# Patient Record
Sex: FEMALE | Race: WHITE | Employment: FULL TIME | URBAN - METROPOLITAN AREA
[De-identification: names, ages, dates, MRNs, and addresses within clinical notes are randomized per-mention and may not be internally consistent; named-entity substitution may affect disease eponyms.]

---

## 2023-12-31 ENCOUNTER — HOSPITAL ENCOUNTER (OUTPATIENT)
Age: 33
Discharge: HOME | End: 2023-12-31
Payer: COMMERCIAL

## 2023-12-31 VITALS
TEMPERATURE: 97 F | HEART RATE: 73 BPM | WEIGHT: 220 LBS | RESPIRATION RATE: 18 BRPM | DIASTOLIC BLOOD PRESSURE: 75 MMHG | HEIGHT: 67 IN | SYSTOLIC BLOOD PRESSURE: 132 MMHG | OXYGEN SATURATION: 100 % | BODY MASS INDEX: 34.53 KG/M2

## 2023-12-31 DIAGNOSIS — N30.00 ACUTE CYSTITIS WITHOUT HEMATURIA: Primary | ICD-10-CM

## 2023-12-31 LAB
B-HCG UR QL: NEGATIVE
BILIRUB UR QL STRIP: NEGATIVE
COLOR UR: YELLOW
GLUCOSE UR STRIP-MCNC: NEGATIVE MG/DL
KETONES UR STRIP-MCNC: NEGATIVE MG/DL
NITRITE UR QL STRIP: NEGATIVE
PH UR STRIP: 6 [PH]
PROT UR STRIP-MCNC: 30 MG/DL
SP GR UR STRIP: >=1.03
UROBILINOGEN UR STRIP-ACNC: <2 MG/DL

## 2023-12-31 PROCEDURE — 87086 URINE CULTURE/COLONY COUNT: CPT | Performed by: PHYSICIAN ASSISTANT

## 2023-12-31 RX ORDER — CEPHALEXIN 500 MG/1
500 CAPSULE ORAL 2 TIMES DAILY
Qty: 14 CAPSULE | Refills: 0 | Status: SHIPPED | OUTPATIENT
Start: 2023-12-31 | End: 2024-01-07

## 2023-12-31 NOTE — DISCHARGE INSTRUCTIONS
Take the antibiotic as directed. Follow up with your primary doctor. If you have new, changing or worsening symptoms, please go directly to the ER.

## 2023-12-31 NOTE — ED INITIAL ASSESSMENT (HPI)
Patient is having trouble fully emptying her bladder and has frequency. She has discomfort when going.

## 2024-07-25 ENCOUNTER — OFFICE VISIT (OUTPATIENT)
Dept: FAMILY MEDICINE CLINIC | Facility: CLINIC | Age: 34
End: 2024-07-25
Payer: COMMERCIAL

## 2024-07-25 VITALS
SYSTOLIC BLOOD PRESSURE: 130 MMHG | WEIGHT: 242 LBS | DIASTOLIC BLOOD PRESSURE: 78 MMHG | HEIGHT: 67 IN | OXYGEN SATURATION: 97 % | BODY MASS INDEX: 37.98 KG/M2 | HEART RATE: 96 BPM | RESPIRATION RATE: 16 BRPM

## 2024-07-25 DIAGNOSIS — F32.A DEPRESSION, UNSPECIFIED DEPRESSION TYPE: ICD-10-CM

## 2024-07-25 DIAGNOSIS — Z00.00 ROUTINE GENERAL MEDICAL EXAMINATION AT A HEALTH CARE FACILITY: Primary | ICD-10-CM

## 2024-07-25 DIAGNOSIS — F43.9 STRESS: ICD-10-CM

## 2024-07-25 DIAGNOSIS — Z12.4 PAP SMEAR FOR CERVICAL CANCER SCREENING: ICD-10-CM

## 2024-07-25 PROCEDURE — 3075F SYST BP GE 130 - 139MM HG: CPT | Performed by: INTERNAL MEDICINE

## 2024-07-25 PROCEDURE — 3078F DIAST BP <80 MM HG: CPT | Performed by: INTERNAL MEDICINE

## 2024-07-25 PROCEDURE — 3008F BODY MASS INDEX DOCD: CPT | Performed by: INTERNAL MEDICINE

## 2024-07-25 PROCEDURE — 99385 PREV VISIT NEW AGE 18-39: CPT | Performed by: INTERNAL MEDICINE

## 2024-07-25 RX ORDER — BUPROPION HYDROCHLORIDE 150 MG/1
1 TABLET ORAL DAILY
COMMUNITY
Start: 2022-07-11 | End: 2024-07-28

## 2024-07-25 RX ORDER — MELOXICAM 15 MG/1
15 TABLET ORAL
COMMUNITY
Start: 2024-04-09 | End: 2024-07-28

## 2024-07-25 NOTE — PROGRESS NOTES
HPI:   Edith Diaz is a 34 year old female, new to our practice, who presents for a well woman exam. Symptoms:  breastfeeding until April, recently had an  2 weeks ago , bleeding still.    No LMP recorded. (Menstrual status: Irregular Periods).  Previous pap: yes, last one 2-3 years ago  Performs SBEs:no  Contraception: nothing                          Thought she had carpal tunnel. Hands feel tired and they hurt. Specialist didn't think she had it, gave Mobic. Sent to a neuro provider. Not taking Mobic at this time.    Not on bupropion. Stopped when she was pregnant.    Pt under a lot of stress. Working full time in a high stress job. 3 children at home. Has an hour commute to work and back each day.    Current Outpatient Medications   Medication Sig Dispense Refill    Meloxicam 15 MG Oral Tab Take 1 tablet (15 mg total) by mouth daily with food. (Patient not taking: Reported on 2024)      Levonorgestrel 20 MCG/DAY Intrauterine IUD 20 mcg (1 each total) by Intrauterine route. (Patient not taking: Reported on 2024)      buPROPion  MG Oral Tablet 24 Hr Take 1 tablet (150 mg total) by mouth daily. (Patient not taking: Reported on 2024)        No past medical history on file.   No past surgical history on file.   No family history on file.   Social History:   Social History     Socioeconomic History    Marital status: Single   Work full time    Exercise: none.  Diet: doesn't watch     REVIEW OF SYSTEMS:   GENERAL: feels well otherwise  SKIN: denies unusual skin lesions  HEENT:no vision or hearing changes; denies nasal congestion, sinus pain or sore throat  LUNGS: denies shortness of breath, chest heaviness or cough  CV: denies chest pain, pressure or palpitations  GI: denies abdominal pain; denies heartburn, frequent diarrhea or constipation  : denies dysuria, vaginal discharge or itching; denies pelvic pain  MS: denies back pain  NEURO: denies headaches; no dizziness  PSYCH: feeling  sad, overwhelmed, stressed. Not sleeping well, not sleeping enough. Denies SI, HI, or thought of harm to others.  No report of panic.   HEME: denies hx of anemia  ENDOCRINE: denies thyroid history, denies excessive thirst, denies significant weight change  ALL/ASTHMA: denies hx of  allergy or asthma    EXAM:   /78   Pulse 96   Resp 16   Ht 5' 7\" (1.702 m)   Wt 242 lb (109.8 kg)   SpO2 97%   BMI 37.90 kg/m²       Body mass index is 37.9 kg/m².      GENERAL: pleasant and in no acute distress  SKIN: warm and dry  HEENT: atraumatic, normocephalic; PERRLA, conjunctiva clear; ears, nose and throat are clear  NECK: supple,no adenopathy, no thyromegaly  LUNGS: clear to auscultation, easy breathing  CV: normal S1S2, RRR without murmur  MS: Sussy, no bony deformities  EXT: no cyanosis, clubbing or edema  NEURO: A&Ox3, motor and sensory are grossly intact, good coordination; no tremors  PSYCH: pleasant, tearful slightly, somewhat shy or guarded, to discuss her feelings but answers my questions      ASSESSMENT AND PLAN:    1. Routine general medical examination at a health care facility  Reinforced routine SBEs. Discussed the benefits of routine exercise, a heart healthy diet and annual flu vaccines.  - CBC With Differential With Platelet; Future  - Comp Metabolic Panel (14); Future  - Lipid Panel; Future  - TSH W Reflex To Free T4; Future  - Hemoglobin A1C; Future  - [facility] referral for pap smear    2. Stress  -- discussed counseling with or without medicine, pt doesn't answer to either option at this time  - [facility] Referral - In Network    3. Depression, unspecified depression type  - discussed counseling with or without medicine, pt doesn't answer to either option at this time  - [facility] Referral - In Network          Edith was given an opportunity to ask questions and verbalized understanding of care. Follow up 1 month, sooner if needed.             .

## 2024-07-26 ENCOUNTER — LAB ENCOUNTER (OUTPATIENT)
Dept: LAB | Age: 34
End: 2024-07-26
Attending: INTERNAL MEDICINE
Payer: COMMERCIAL

## 2024-07-26 DIAGNOSIS — Z00.00 ROUTINE GENERAL MEDICAL EXAMINATION AT A HEALTH CARE FACILITY: ICD-10-CM

## 2024-07-26 LAB
ALBUMIN SERPL-MCNC: 4.5 G/DL (ref 3.2–4.8)
ALBUMIN/GLOB SERPL: 1.5 {RATIO} (ref 1–2)
ALP LIVER SERPL-CCNC: 81 U/L
ALT SERPL-CCNC: 54 U/L
ANION GAP SERPL CALC-SCNC: 8 MMOL/L (ref 0–18)
AST SERPL-CCNC: 34 U/L (ref ?–34)
BASOPHILS # BLD AUTO: 0.03 X10(3) UL (ref 0–0.2)
BASOPHILS NFR BLD AUTO: 0.5 %
BILIRUB SERPL-MCNC: 0.4 MG/DL (ref 0.3–1.2)
BUN BLD-MCNC: 6 MG/DL (ref 9–23)
CALCIUM BLD-MCNC: 9.5 MG/DL (ref 8.7–10.4)
CHLORIDE SERPL-SCNC: 108 MMOL/L (ref 98–112)
CHOLEST SERPL-MCNC: 203 MG/DL (ref ?–200)
CO2 SERPL-SCNC: 21 MMOL/L (ref 21–32)
CREAT BLD-MCNC: 0.73 MG/DL
EGFRCR SERPLBLD CKD-EPI 2021: 112 ML/MIN/1.73M2 (ref 60–?)
EOSINOPHIL # BLD AUTO: 0.13 X10(3) UL (ref 0–0.7)
EOSINOPHIL NFR BLD AUTO: 2.3 %
ERYTHROCYTE [DISTWIDTH] IN BLOOD BY AUTOMATED COUNT: 12.6 %
EST. AVERAGE GLUCOSE BLD GHB EST-MCNC: 111 MG/DL (ref 68–126)
FASTING PATIENT LIPID ANSWER: YES
FASTING STATUS PATIENT QL REPORTED: YES
GLOBULIN PLAS-MCNC: 3.1 G/DL (ref 2–3.5)
GLUCOSE BLD-MCNC: 95 MG/DL (ref 70–99)
HBA1C MFR BLD: 5.5 % (ref ?–5.7)
HCT VFR BLD AUTO: 42.6 %
HDLC SERPL-MCNC: 41 MG/DL (ref 40–59)
HGB BLD-MCNC: 14.6 G/DL
IMM GRANULOCYTES # BLD AUTO: 0.02 X10(3) UL (ref 0–1)
IMM GRANULOCYTES NFR BLD: 0.3 %
LDLC SERPL CALC-MCNC: 149 MG/DL (ref ?–100)
LYMPHOCYTES # BLD AUTO: 1.96 X10(3) UL (ref 1–4)
LYMPHOCYTES NFR BLD AUTO: 34 %
MCH RBC QN AUTO: 30.3 PG (ref 26–34)
MCHC RBC AUTO-ENTMCNC: 34.3 G/DL (ref 31–37)
MCV RBC AUTO: 88.4 FL
MONOCYTES # BLD AUTO: 0.39 X10(3) UL (ref 0.1–1)
MONOCYTES NFR BLD AUTO: 6.8 %
NEUTROPHILS # BLD AUTO: 3.23 X10 (3) UL (ref 1.5–7.7)
NEUTROPHILS # BLD AUTO: 3.23 X10(3) UL (ref 1.5–7.7)
NEUTROPHILS NFR BLD AUTO: 56.1 %
NONHDLC SERPL-MCNC: 162 MG/DL (ref ?–130)
OSMOLALITY SERPL CALC.SUM OF ELEC: 281 MOSM/KG (ref 275–295)
PLATELET # BLD AUTO: 295 10(3)UL (ref 150–450)
POTASSIUM SERPL-SCNC: 3.9 MMOL/L (ref 3.5–5.1)
PROT SERPL-MCNC: 7.6 G/DL (ref 5.7–8.2)
RBC # BLD AUTO: 4.82 X10(6)UL
SODIUM SERPL-SCNC: 137 MMOL/L (ref 136–145)
TRIGL SERPL-MCNC: 70 MG/DL (ref 30–149)
TSI SER-ACNC: 1.43 MIU/ML (ref 0.55–4.78)
VLDLC SERPL CALC-MCNC: 13 MG/DL (ref 0–30)
WBC # BLD AUTO: 5.8 X10(3) UL (ref 4–11)

## 2024-07-26 PROCEDURE — 80050 GENERAL HEALTH PANEL: CPT | Performed by: INTERNAL MEDICINE

## 2024-07-26 PROCEDURE — 83036 HEMOGLOBIN GLYCOSYLATED A1C: CPT | Performed by: INTERNAL MEDICINE

## 2024-07-26 PROCEDURE — 80061 LIPID PANEL: CPT | Performed by: INTERNAL MEDICINE

## 2024-07-28 PROBLEM — G56.00 CARPAL TUNNEL SYNDROME: Status: ACTIVE | Noted: 2024-04-09

## 2024-07-30 DIAGNOSIS — E78.00 HIGH CHOLESTEROL: Primary | ICD-10-CM

## 2024-09-03 ENCOUNTER — TELEMEDICINE (OUTPATIENT)
Dept: FAMILY MEDICINE CLINIC | Facility: CLINIC | Age: 34
End: 2024-09-03
Payer: COMMERCIAL

## 2024-09-03 ENCOUNTER — PATIENT MESSAGE (OUTPATIENT)
Dept: FAMILY MEDICINE CLINIC | Facility: CLINIC | Age: 34
End: 2024-09-03

## 2024-09-03 DIAGNOSIS — Z71.3 ENCOUNTER FOR WEIGHT LOSS COUNSELING: ICD-10-CM

## 2024-09-03 DIAGNOSIS — E66.01 CLASS 2 SEVERE OBESITY DUE TO EXCESS CALORIES WITH SERIOUS COMORBIDITY AND BODY MASS INDEX (BMI) OF 37.0 TO 37.9 IN ADULT (HCC): Primary | ICD-10-CM

## 2024-09-03 DIAGNOSIS — E78.00 PURE HYPERCHOLESTEROLEMIA: ICD-10-CM

## 2024-09-03 DIAGNOSIS — E78.00 HIGH CHOLESTEROL: ICD-10-CM

## 2024-09-03 PROCEDURE — 99213 OFFICE O/P EST LOW 20 MIN: CPT | Performed by: INTERNAL MEDICINE

## 2024-09-03 NOTE — PROGRESS NOTES
Video Visit  Edith Diaz is a 34 year old female.  Chief Complaint   Patient presents with    Weight Loss         HPI:   Pt requests a video visit to discuss trying medication to help with weight loss.  Cholesterol elevated.  Blood sugar was unremarkable.  Pt would like to exercise more but travels over an hour to and from work, works long hours, full time.  Has 3 children at home.  Very busy, + stressful.    Current Outpatient Medications   Medication Sig Dispense Refill    ondansetron 8 MG Oral Tablet Dispersible Take 1 tablet (8 mg total) by mouth every 8 (eight) hours as needed for Nausea. 15 tablet 0    Tirzepatide-Weight Management (ZEPBOUND) 2.5 MG/0.5ML Subcutaneous Solution Auto-injector Inject 2.5 mg into the skin once a week for 4 doses. 2 mL 0      No past medical history on file.   No past surgical history on file.   Social History:  Social History     Socioeconomic History    Marital status: Single        REVIEW OF SYSTEMS:   GENERAL HEALTH: Denies fevers, chills or myalgias  HEENT: Denies ear pain, nasal congestion, sinus pain, or sore throat  SKIN: Denies rash  LUNGS: Denies shortness of breath, wheezing, or cough  CV: Denies chest pain or palpitations; denies lightheadedness  GI: Denies abdominal pain, denies N/V/D  MS: Denies back, neck or joint pain  NEURO: Denies headaches  ALLERGY/ASTHMA: Denies asthma and environmental allergies       EXAM:   GENERAL: well developed, well nourished, NAD.  SKIN: No rash visible  HEENT: AT/NC. Normal lips, gums and teeth; no hyponasal tone on video visit  LUNGS: Speaking in complete sentences comfortably without increased work of breathing; no cough during visit  PSYCH: Normal mood and affect, A&Ox3, affect is consistent with mood      ASSESSMENT AND PLAN:   1. BMI 37.0-37.9, adult  Discussed SEs, risks, benefits of Semagutides.   Pt instructed to call her insurance to see which injectable is preferred.    2. High cholesterol  Dietary modifications  discussed  Weight loss desirable   Recheck in 3 months    3. Encounter for weight loss counseling  Heart healthy meal planning and increase in exercise is recommended.  Discussed that weight loss medications are not approved for life long use and will need to be weaned off of eventually.  [facility] referral.          The patient indicates understanding of these issues and agrees to the plan.    Follow up 1 month    Duration of visit: 12 min      Edith Diaz understands a video evaluation is not a substitute for face-to-face examination or emergency care. Patient advised to go to ER or call 911 for worsening symptoms or acute distress.   Please note that the following visit was completed using two-way, real-time interactive video communication. This has been done in good calli to provide continuity of care in the best interest of the provider-patient relationship, due to the ongoing public health crisis/national emergency and because of restrictions of visitation. There are limitations of this visit as limited physical exam could be performed. Every conscious effort was taken to allow for sufficient and adequate time. This billing was spent on reviewing labs, medications, radiology tests and decision making. Appropriate medical decision-making and tests are ordered as detailed in the plan of care above.

## 2024-09-04 RX ORDER — TIRZEPATIDE 2.5 MG/.5ML
2.5 INJECTION, SOLUTION SUBCUTANEOUS WEEKLY
Qty: 2 ML | Refills: 0 | Status: SHIPPED | OUTPATIENT
Start: 2024-09-04 | End: 2024-09-26

## 2024-09-04 NOTE — TELEPHONE ENCOUNTER
From: Edith Diaz  To: Faye Ramos  Sent: 9/3/2024 6:20 PM CDT  Subject: Insurance Approved Meds    Blayne Mckinney, I confirmed with my insurance that they cover Zepbound, Wagovy, and Saxenda.

## 2024-09-05 RX ORDER — ONDANSETRON 4 MG/1
4 TABLET, ORALLY DISINTEGRATING ORAL EVERY 8 HOURS PRN
Refills: 0 | Status: CANCELLED
Start: 2024-09-05

## 2024-09-05 RX ORDER — ONDANSETRON 8 MG/1
8 TABLET, ORALLY DISINTEGRATING ORAL EVERY 8 HOURS PRN
Qty: 15 TABLET | Refills: 0 | Status: SHIPPED | OUTPATIENT
Start: 2024-09-05

## 2024-09-28 DIAGNOSIS — E66.812 CLASS 2 SEVERE OBESITY DUE TO EXCESS CALORIES WITH SERIOUS COMORBIDITY AND BODY MASS INDEX (BMI) OF 37.0 TO 37.9 IN ADULT (HCC): ICD-10-CM

## 2024-09-28 DIAGNOSIS — E78.00 PURE HYPERCHOLESTEROLEMIA: ICD-10-CM

## 2024-09-28 DIAGNOSIS — E66.01 CLASS 2 SEVERE OBESITY DUE TO EXCESS CALORIES WITH SERIOUS COMORBIDITY AND BODY MASS INDEX (BMI) OF 37.0 TO 37.9 IN ADULT (HCC): ICD-10-CM

## 2024-09-30 ENCOUNTER — PATIENT MESSAGE (OUTPATIENT)
Dept: FAMILY MEDICINE CLINIC | Facility: CLINIC | Age: 34
End: 2024-09-30

## 2024-09-30 RX ORDER — TIRZEPATIDE 2.5 MG/.5ML
2.5 INJECTION, SOLUTION SUBCUTANEOUS WEEKLY
Refills: 0 | OUTPATIENT
Start: 2024-09-30 | End: 2024-10-22

## 2024-09-30 RX ORDER — TIRZEPATIDE 2.5 MG/.5ML
2.5 INJECTION, SOLUTION SUBCUTANEOUS WEEKLY
COMMUNITY
Start: 2024-09-05 | End: 2024-12-02 | Stop reason: DRUGHIGH

## 2024-09-30 NOTE — TELEPHONE ENCOUNTER
A refill request was received for:  Requested Prescriptions     Pending Prescriptions Disp Refills    ZEPBOUND 2.5 MG/0.5ML Subcutaneous Solution Auto-injector [Pharmacy Med Name: ZEPBOUND 2.5 MG/0.5 ML PEN]  0     Sig: INJECT 2.5 MG INTO THE SKIN ONCE A WEEK FOR 4 DOSES.       Last refill date:09/05/24       Last office visit:07/25/24    Future Appointments   Date Time Provider Department Center   10/1/2024  4:00 PM Sonya Aquino APRN [facility]

## 2024-10-01 ENCOUNTER — OFFICE VISIT (OUTPATIENT)
Facility: CLINIC | Age: 34
End: 2024-10-01
Payer: COMMERCIAL

## 2024-10-01 VITALS
SYSTOLIC BLOOD PRESSURE: 118 MMHG | HEIGHT: 67 IN | DIASTOLIC BLOOD PRESSURE: 78 MMHG | BODY MASS INDEX: 37.67 KG/M2 | WEIGHT: 240 LBS

## 2024-10-01 DIAGNOSIS — Z30.011 ENCOUNTER FOR BCP (BIRTH CONTROL PILLS) INITIAL PRESCRIPTION: ICD-10-CM

## 2024-10-01 DIAGNOSIS — Z32.02 PREGNANCY EXAMINATION OR TEST, NEGATIVE RESULT: ICD-10-CM

## 2024-10-01 DIAGNOSIS — Z98.890 HISTORY OF ELECTIVE ABORTION: ICD-10-CM

## 2024-10-01 DIAGNOSIS — Z32.00 ENCOUNTER FOR PREGNANCY TEST, RESULT UNKNOWN: ICD-10-CM

## 2024-10-01 DIAGNOSIS — N93.9 ABNORMAL UTERINE BLEEDING (AUB): Primary | ICD-10-CM

## 2024-10-01 LAB
KIT LOT #: NORMAL NUMERIC
PREGNANCY TEST, URINE: NEGATIVE

## 2024-10-01 PROCEDURE — 3078F DIAST BP <80 MM HG: CPT

## 2024-10-01 PROCEDURE — 3008F BODY MASS INDEX DOCD: CPT

## 2024-10-01 PROCEDURE — 3074F SYST BP LT 130 MM HG: CPT

## 2024-10-01 PROCEDURE — 81025 URINE PREGNANCY TEST: CPT

## 2024-10-01 PROCEDURE — 99213 OFFICE O/P EST LOW 20 MIN: CPT

## 2024-10-01 RX ORDER — NORETHINDRONE ACETATE AND ETHINYL ESTRADIOL 1MG-20(21)
1 KIT ORAL DAILY
Qty: 28 TABLET | Refills: 2 | Status: SHIPPED | OUTPATIENT
Start: 2024-10-01 | End: 2025-10-01

## 2024-10-01 NOTE — PROGRESS NOTES
Gynecology Office Visit      Edith Diaz is a 34 year old female  Patient's last menstrual period was 2024 (approximate). (contraception:  none)     HPI:     Chief Complaint   Patient presents with    Menstrual Problem     Patient reports she had bleeding since August until mid september. Had  in July.     Edith presents for vaginal bleeding lasting approximately 6 weeks from  to mid-September. She stopped breastfeeding in April and got a period in May which was normal. She then missed a period in  and found out she was pregnant. She then had a surgical D+C at a family planning clinic in [place] at about 8-9 weeks gestation - an ultrasound was not done afterwards. She had bleeding after the procedure which stopped but then started again during the first week of August and lasted until September. When she was persistently bleeding, the flow was getting lighter and she did not have clots - just persistent spotting. She has since stopped bleeding. Not currently on any form of contraception. Was given a paper prescription for OCPs after her EAB but she misplaced the paper and never got the prescription. Has used Depo and mirena IUD in the past for contraception. Interested in starting OCPs for contraception at this time.    Chart and previous encounters reviewed.  HISTORY:  History reviewed. No pertinent past medical history.   Past Surgical History:   Procedure Laterality Date    Elbow surgery Left       Family History   Problem Relation Age of Onset    Other (assassinated) Father         age 65    Other (T2 diabetic) Mother         insulin    Depression Mother     Breast Cancer Mother 40        had genetic cancer screening: Unsure of results.    Other (GERD) Brother     Breast Cancer Maternal Aunt 42      Social History:   Social History     Socioeconomic History    Marital status: Single   Tobacco Use    Smoking status: Never     Passive exposure: Never    Smokeless tobacco:  Never   Substance and Sexual Activity    Alcohol use: Yes    Drug use: Not Currently     Types: Cannabis        Medications (Active prior to today's visit):  Current Outpatient Medications   Medication Sig Dispense Refill    Norethin Ace-Eth Estrad-FE (JUNEL FE 1/20) 1-20 MG-MCG Oral Tab Take 1 tablet by mouth daily. 28 tablet 2    ZEPBOUND 2.5 MG/0.5ML Subcutaneous Solution Auto-injector Inject 2.5 mg into the skin once a week.      ondansetron 8 MG Oral Tablet Dispersible Take 1 tablet (8 mg total) by mouth every 8 (eight) hours as needed for Nausea. (Patient not taking: Reported on 10/1/2024) 15 tablet 0       Allergies:  No Known Allergies    Gyn:  Menarche: 13-14  Period Cycle (Days): Irregular  Period Duration (Days): 3-4  Period Flow: moderate, with cramping  Use of Birth Control (if yes, specify type): None  Date When Birth Control Last Used: Mirena removed   Pap Date: 03/18/15  Pap Result Notes: ASCUS.  (  abnormal per pt in [place])  Follow Up Recommendation: due    OB Hx:  OB History    Para Term  AB Living   4 3 3   1 3   SAB IAB Ectopic Multiple Live Births     1     3      # Outcome Date GA Lbr Rob/2nd Weight Sex Type Anes PTL Lv   4 IAB 2024     THERAPEUTIC      3 Term 22    F NORMAL SPONT   ANGELIQUE   2 Term 02/09/15    F NORMAL SPONT   ANGELIQUE   1 Term 15    F NORMAL SPONT   ANGELIQUE         ROS:     10 point ROS completed and was negative, except for pertinent positive and negatives stated in the HPI.    PHYSICAL EXAM:   /78   Ht 67\"   Wt 240 lb (108.9 kg)   LMP 2024 (Approximate)   BMI 37.59 kg/m²      Wt Readings from Last 6 Encounters:   10/01/24 240 lb (108.9 kg)   24 242 lb (109.8 kg)   23 220 lb (99.8 kg)        Gen:  Oriented, in no acute distress  Abdomen: no scars, scaphoid, benign without peritoneal signs, rebound tenderness,   guarding, or masses    Pelvic:      External Genitalia: Normal appearing, no lesions.  Vagina: normal pink  mucosa, no lesions, normal clear discharge.    no anterior or posterior hernias.  Cervix: multiparous, no lesions , No CMT   Uterus: mobile, non tender, normal size  Adnexa: non tender, no masses, normal size  Rectal: deferred       ASSESSMENT/PLAN:     1. Abnormal uterine bleeding (AUB)  - Urine Preg Test [78293]    2. History of elective     3. Encounter for pregnancy test, result unknown    4. Encounter for BCP (birth control pills) initial prescription  - Norethin Ace-Eth Estrad-FE (JUNEL FE 1/20) 1-20 MG-MCG Oral Tab; Take 1 tablet by mouth daily.  Dispense: 28 tablet; Refill: 2    5. Pregnancy examination or test, negative result    Plan ultrasound to evaluate AUB  Urine pregnancy test negative, appropriate for OCP initiation. Reviewed ACHES and danger signs, rx sent to pharmacy    Meds This Visit:  Requested Prescriptions     Signed Prescriptions Disp Refills    Norethin Ace-Eth Estrad-FE (JUNEL FE 1/20) 1-20 MG-MCG Oral Tab 28 tablet 2     Sig: Take 1 tablet by mouth daily.       Imaging & Referrals:  None     Return in about 3 weeks (around 10/22/2024) for ultrasound, 3 months for OCP follow up.    Sonyajessica Aquino, AVERY  10/1/2024  4:06 PM    This note was created by Layar voice recognition. Errors in content may be related to improper recognition by the system; efforts to review and correct have been done but errors may still exist. Please contact me with any questions.    Note to patient and family   The 21st Century Cures Act makes medical notes available to patients in the interest of transparency.  However, please be advised that this is a medical document.  It is intended as oism-tz-exvu communication.  It is written and medical language may contain abbreviations or verbiage that are technical and unfamiliar.  It may appear blunt or direct.  Medical documents are intended to carry relevant information, facts as evident, and the clinical opinion of the practitioner.

## 2024-10-12 ENCOUNTER — TELEMEDICINE (OUTPATIENT)
Dept: FAMILY MEDICINE CLINIC | Facility: CLINIC | Age: 34
End: 2024-10-12
Payer: COMMERCIAL

## 2024-10-12 DIAGNOSIS — Z51.81 ENCOUNTER FOR THERAPEUTIC DRUG MONITORING: Primary | ICD-10-CM

## 2024-10-12 DIAGNOSIS — E66.812 CLASS 2 OBESITY DUE TO EXCESS CALORIES WITHOUT SERIOUS COMORBIDITY WITH BODY MASS INDEX (BMI) OF 37.0 TO 37.9 IN ADULT: ICD-10-CM

## 2024-10-12 DIAGNOSIS — E66.09 CLASS 2 OBESITY DUE TO EXCESS CALORIES WITHOUT SERIOUS COMORBIDITY WITH BODY MASS INDEX (BMI) OF 37.0 TO 37.9 IN ADULT: ICD-10-CM

## 2024-10-12 PROCEDURE — 99213 OFFICE O/P EST LOW 20 MIN: CPT | Performed by: INTERNAL MEDICINE

## 2024-10-12 RX ORDER — TIRZEPATIDE 5 MG/.5ML
5 INJECTION, SOLUTION SUBCUTANEOUS WEEKLY
Qty: 2 ML | Refills: 0 | Status: SHIPPED | OUTPATIENT
Start: 2024-10-12 | End: 2024-11-03

## 2024-10-12 NOTE — PROGRESS NOTES
Video Visit  Edith iDaz is a 34 year old female.  No chief complaint on file.        HPI:   Pt requests a video visit to discuss weight loss follow up.  Started on Zepbound, baseline weight 242#    Zepbound SEs- diarrhea or emesis, felt like she had a sour stomach. Occurred 3 out of the 4 weeks. Called off the first week she was on it. Then it got a little better, where it only lasted one day. The last dose week, she felt sick one time that day.   Fluctuates between 234-237. #235  5'7\"  Would like to continue to see if she 'gets used to' the med and subsequent SEs.    Current Outpatient Medications   Medication Sig Dispense Refill    Norethin Ace-Eth Estrad-FE (JUNEL FE 1/20) 1-20 MG-MCG Oral Tab Take 1 tablet by mouth daily. 28 tablet 2    ZEPBOUND 2.5 MG/0.5ML Subcutaneous Solution Auto-injector Inject 2.5 mg into the skin once a week.      ondansetron 8 MG Oral Tablet Dispersible Take 1 tablet (8 mg total) by mouth every 8 (eight) hours as needed for Nausea. (Patient not taking: Reported on 10/1/2024) 15 tablet 0      No past medical history on file.   Past Surgical History:   Procedure Laterality Date    Elbow surgery Left       Social History:  Social History     Socioeconomic History    Marital status: Single   Tobacco Use    Smoking status: Never     Passive exposure: Never    Smokeless tobacco: Never   Substance and Sexual Activity    Alcohol use: Yes    Drug use: Not Currently     Types: Cannabis        REVIEW OF SYSTEMS:   GENERAL HEALTH: Denies fevers, chills or myalgias  HEENT: Denies ear pain, nasal congestion, sinus pain, or sore throat  SKIN: Denies rash  LUNGS: Denies shortness of breath, wheezing, or cough  CV: Denies chest pain or palpitations; denies lightheadedness  GI: Denies abdominal pain,+ nausea + emesis, + diarrhea  MS: Denies back, neck or joint pain  NEURO: Denies headaches          EXAM:   GENERAL: well developed, well nourished, NAD.  SKIN: No rash visible  HEENT: AT/NC. Normal lips,  gums and teeth; no hyponasal tone on video visit  LUNGS: Speaking in complete sentences comfortably without increased work of breathing; no cough during visit  PSYCH: Normal mood and affect, A&Ox3, affect is consistent with mood      ASSESSMENT AND PLAN:    1. Encounter for therapeutic drug monitoring  - discussed going back down to 2.5mg, pt would like to stick to 5mg for now  - offered zofran, pt states the symptoms come on suddenly and last only a few minutes and then subsides.  - Tirzepatide-Weight Management (ZEPBOUND) 5 MG/0.5ML Subcutaneous Solution Auto-injector; Inject 5 mg into the skin once a week for 4 doses.  Dispense: 2 mL; Refill: 0    2. Class 2 obesity due to excess calories without serious comorbidity with body mass index (BMI) of 37.0 to 37.9 in adult  - reinforced ways to try to exercise  - snacks should be high protein, lean/low fats  - continue pushing a lot of water throughout the day  - Tirzepatide-Weight Management (ZEPBOUND) 5 MG/0.5ML Subcutaneous Solution Auto-injector; Inject 5 mg into the skin once a week for 4 doses.  Dispense: 2 mL; Refill: 0          The patient indicates understanding of these issues and agrees to the plan.    Follow up 3 months    Duration of visit: 20 min      Edith Diaz understands a video evaluation is not a substitute for face-to-face examination or emergency care. Patient advised to go to ER or call 911 for worsening symptoms or acute distress.   Please note that the following visit was completed using two-way, real-time interactive video communication. This has been done in good calil to provide continuity of care in the best interest of the provider-patient relationship, due to the ongoing public health crisis/national emergency and because of restrictions of visitation. There are limitations of this visit as limited physical exam could be performed. Every conscious effort was taken to allow for sufficient and adequate time. This billing was spent on  reviewing labs, medications, radiology tests and decision making. Appropriate medical decision-making and tests are ordered as detailed in the plan of care above.

## 2024-11-29 DIAGNOSIS — Z51.81 ENCOUNTER FOR THERAPEUTIC DRUG MONITORING: Primary | ICD-10-CM

## 2024-11-29 DIAGNOSIS — E66.812 CLASS 2 OBESITY DUE TO EXCESS CALORIES WITHOUT SERIOUS COMORBIDITY WITH BODY MASS INDEX (BMI) OF 38.0 TO 38.9 IN ADULT: ICD-10-CM

## 2024-11-29 DIAGNOSIS — E66.09 CLASS 2 OBESITY DUE TO EXCESS CALORIES WITHOUT SERIOUS COMORBIDITY WITH BODY MASS INDEX (BMI) OF 38.0 TO 38.9 IN ADULT: ICD-10-CM

## 2024-11-29 RX ORDER — TIRZEPATIDE 2.5 MG/.5ML
2.5 INJECTION, SOLUTION SUBCUTANEOUS WEEKLY
Refills: 0 | Status: CANCELLED | OUTPATIENT
Start: 2024-11-29

## 2024-12-02 ENCOUNTER — TELEPHONE (OUTPATIENT)
Dept: FAMILY MEDICINE CLINIC | Facility: CLINIC | Age: 34
End: 2024-12-02

## 2024-12-02 RX ORDER — ONDANSETRON 8 MG/1
8 TABLET, ORALLY DISINTEGRATING ORAL EVERY 8 HOURS PRN
Qty: 15 TABLET | Refills: 0 | Status: SHIPPED | OUTPATIENT
Start: 2024-12-02

## 2024-12-02 RX ORDER — TIRZEPATIDE 7.5 MG/.5ML
7.5 INJECTION, SOLUTION SUBCUTANEOUS WEEKLY
Qty: 2 ML | Refills: 0 | Status: SHIPPED | OUTPATIENT
Start: 2024-12-02 | End: 2024-12-24

## 2024-12-02 NOTE — TELEPHONE ENCOUNTER
Please inquire if pt is ok with me increasing to the next higher dose.  Also she texted about not feeling well mentally but didn't call back.

## 2024-12-02 NOTE — TELEPHONE ENCOUNTER
A refill request was received for:  Requested Prescriptions     Pending Prescriptions Disp Refills    ZEPBOUND 5 MG/0.5ML Subcutaneous Solution Auto-injector [Pharmacy Med Name: ZEPBOUND 5 MG/0.5 ML PEN]  0     Sig: INJECT 5 MG INTO THE SKIN ONCE A WEEK FOR 4 DOSES.       Last refill date:   10-12-24    Last office visit: 9-3-24    Follow up due:  No future appointments.

## 2024-12-02 NOTE — TELEPHONE ENCOUNTER
A refill request was received for:  Requested Prescriptions     Pending Prescriptions Disp Refills    ZEPBOUND 2.5 MG/0.5ML Subcutaneous Solution Auto-injector  0     Sig: Inject 2.5 mg into the skin once a week.       Last refill date: 9/5/2024      Last office visit: 10/1/2024    Follow up due:  No future appointments.

## 2024-12-03 RX ORDER — TIRZEPATIDE 5 MG/.5ML
INJECTION, SOLUTION SUBCUTANEOUS
Refills: 0 | OUTPATIENT
Start: 2024-12-03

## 2024-12-26 DIAGNOSIS — E66.09 CLASS 2 OBESITY DUE TO EXCESS CALORIES WITHOUT SERIOUS COMORBIDITY WITH BODY MASS INDEX (BMI) OF 38.0 TO 38.9 IN ADULT: ICD-10-CM

## 2024-12-26 DIAGNOSIS — E66.812 CLASS 2 OBESITY DUE TO EXCESS CALORIES WITHOUT SERIOUS COMORBIDITY WITH BODY MASS INDEX (BMI) OF 38.0 TO 38.9 IN ADULT: ICD-10-CM

## 2024-12-26 DIAGNOSIS — Z51.81 ENCOUNTER FOR THERAPEUTIC DRUG MONITORING: ICD-10-CM

## 2024-12-27 NOTE — TELEPHONE ENCOUNTER
A refill request was received for:  Requested Prescriptions     Pending Prescriptions Disp Refills    ZEPBOUND 7.5 MG/0.5ML Subcutaneous Solution Auto-injector [Pharmacy Med Name: ZEPBOUND 7.5 MG/0.5 ML PEN]  0     Sig: INJECT 7.5 MG INTO THE SKIN ONCE A WEEK FOR 4 DOSES.       Last refill date:   12/2/2024    Last office visit: 7/25/2024    Follow up due:  Future Appointments   Date Time Provider Department Center   1/2/2025 10:00 AM Faye Ramos, NP [facility] [address]

## 2024-12-28 RX ORDER — TIRZEPATIDE 10 MG/.5ML
10 INJECTION, SOLUTION SUBCUTANEOUS WEEKLY
Qty: 2 ML | Refills: 0 | Status: SHIPPED | OUTPATIENT
Start: 2024-12-28 | End: 2025-01-19

## 2024-12-28 RX ORDER — TIRZEPATIDE 7.5 MG/.5ML
7.5 INJECTION, SOLUTION SUBCUTANEOUS WEEKLY
Refills: 0 | OUTPATIENT
Start: 2024-12-28 | End: 2025-01-19

## 2025-01-20 ENCOUNTER — OFFICE VISIT (OUTPATIENT)
Dept: FAMILY MEDICINE CLINIC | Facility: CLINIC | Age: 35
End: 2025-01-20
Payer: COMMERCIAL

## 2025-01-20 VITALS
HEIGHT: 67 IN | WEIGHT: 218 LBS | BODY MASS INDEX: 34.21 KG/M2 | DIASTOLIC BLOOD PRESSURE: 80 MMHG | HEART RATE: 85 BPM | OXYGEN SATURATION: 99 % | SYSTOLIC BLOOD PRESSURE: 120 MMHG | RESPIRATION RATE: 18 BRPM

## 2025-01-20 DIAGNOSIS — E66.09 OBESITY DUE TO EXCESS CALORIES WITHOUT SERIOUS COMORBIDITY, UNSPECIFIED CLASS: Primary | ICD-10-CM

## 2025-01-20 DIAGNOSIS — Z51.81 ENCOUNTER FOR THERAPEUTIC DRUG MONITORING: ICD-10-CM

## 2025-01-20 PROCEDURE — 3008F BODY MASS INDEX DOCD: CPT | Performed by: INTERNAL MEDICINE

## 2025-01-20 PROCEDURE — 3074F SYST BP LT 130 MM HG: CPT | Performed by: INTERNAL MEDICINE

## 2025-01-20 PROCEDURE — 99213 OFFICE O/P EST LOW 20 MIN: CPT | Performed by: INTERNAL MEDICINE

## 2025-01-20 PROCEDURE — 3079F DIAST BP 80-89 MM HG: CPT | Performed by: INTERNAL MEDICINE

## 2025-01-20 RX ORDER — TIRZEPATIDE 10 MG/.5ML
INJECTION, SOLUTION SUBCUTANEOUS
COMMUNITY
Start: 2024-12-28

## 2025-01-20 RX ORDER — TIRZEPATIDE 12.5 MG/.5ML
12.5 INJECTION, SOLUTION SUBCUTANEOUS WEEKLY
Qty: 2 ML | Refills: 0 | Status: SHIPPED | OUTPATIENT
Start: 2025-01-20 | End: 2025-02-11

## 2025-01-20 NOTE — PROGRESS NOTES
Edith is here for medication follow up of weight management.    HPI:   Edith is tolerating Zepbound. Adverse SEs-some nausea.  Has Zofran if needed    Wt Readings from Last 6 Encounters:  10/12/24            242 lb   10/01/24 240 lb (108.9 kg)   07/25/24 242 lb (109.8 kg)   12/31/23 220 lb (99.8 kg)         Current Outpatient Medications   Medication Sig Dispense Refill    ondansetron 8 MG Oral Tablet Dispersible Take 1 tablet (8 mg total) by mouth every 8 (eight) hours as needed for Nausea. 15 tablet 0    Norethin Ace-Eth Estrad-FE (JUNEL FE 1/20) 1-20 MG-MCG Oral Tab Take 1 tablet by mouth daily. 28 tablet 2    ondansetron 8 MG Oral Tablet Dispersible Take 1 tablet (8 mg total) by mouth every 8 (eight) hours as needed for Nausea. (Patient not taking: Reported on 10/1/2024) 15 tablet 0      No past medical history on file.   Patient Active Problem List   Diagnosis    Carpal tunnel syndrome         REVIEW OF SYSTEMS:   RESPIRATORY: denies shortness of breath  CV: denies chest pain, pressure or palpitations  GI: No constipation  PSYCH: has not affected sleep; denies increased anxiety or nervousness    EXAM:   LMP 08/05/2024 (Approximate)   GENERAL: well developed in no apparent distress  HEENT: atraumatic, normocephalic, neck supple, throat clear  LUNGS: clear to auscultation bilaterally, easy breathing  CV: S1 S2, RRR without murmur  GI: abdomen soft, non-tender, active bowel sounds    ASSESSMENT & PLAN:   1. Obesity due to excess calories without serious comorbidity, unspecified class  -Encouraged healthy high-protein snacks and small meals when taken  -Heavily emphasized the need for routine exercise  - ZEPBOUND 10 MG/0.5ML Subcutaneous Solution Auto-injector; INJECT 10 MG INTO THE SKIN ONCE A WEEK FOR 4 DOSES.    2. Encounter for therapeutic drug monitoring  -Refilled Zepbound, increased to 10 mg weekly         Edith indicates understanding of the risks & benefits of prescribed medication, has had the  opportunity to ask questions, and agrees to the plan. I continue to encourage routine exercise and a heart healthy diet for best results and overall improved health.    Follow up in 6 months, sooner if needed.    Requested Prescriptions      No prescriptions requested or ordered in this encounter

## 2025-02-18 ENCOUNTER — OFFICE VISIT (OUTPATIENT)
Dept: FAMILY MEDICINE CLINIC | Facility: CLINIC | Age: 35
End: 2025-02-18
Payer: COMMERCIAL

## 2025-02-18 VITALS
RESPIRATION RATE: 16 BRPM | OXYGEN SATURATION: 97 % | BODY MASS INDEX: 33.43 KG/M2 | SYSTOLIC BLOOD PRESSURE: 116 MMHG | DIASTOLIC BLOOD PRESSURE: 66 MMHG | HEIGHT: 67 IN | TEMPERATURE: 98 F | WEIGHT: 213 LBS | HEART RATE: 87 BPM

## 2025-02-18 DIAGNOSIS — R68.89 FLU-LIKE SYMPTOMS: Primary | ICD-10-CM

## 2025-02-18 PROCEDURE — 3078F DIAST BP <80 MM HG: CPT | Performed by: NURSE PRACTITIONER

## 2025-02-18 PROCEDURE — 3074F SYST BP LT 130 MM HG: CPT | Performed by: NURSE PRACTITIONER

## 2025-02-18 PROCEDURE — 99213 OFFICE O/P EST LOW 20 MIN: CPT | Performed by: NURSE PRACTITIONER

## 2025-02-18 PROCEDURE — 3008F BODY MASS INDEX DOCD: CPT | Performed by: NURSE PRACTITIONER

## 2025-02-19 NOTE — PROGRESS NOTES
CHIEF COMPLAINT:     Chief Complaint   Patient presents with    Sinus Problem     8 days, congestion, lost voice, productive cough, sweats, 102  OTC theraflu, advil cold and sinus        HPI:   Edith Diaz is a 34 year old female who presents for sore throat last week, then laryngitis cough congestion started on Friday. Reports had fever yesterday of 102F.   Taking advil cold/sinus and theraflu. Both daughters sick with flu like symptoms.      Current Outpatient Medications   Medication Sig Dispense Refill    ZEPBOUND 10 MG/0.5ML Subcutaneous Solution Auto-injector INJECT 10 MG INTO THE SKIN ONCE A WEEK FOR 4 DOSES.      ondansetron 8 MG Oral Tablet Dispersible Take 1 tablet (8 mg total) by mouth every 8 (eight) hours as needed for Nausea. 15 tablet 0      No past medical history on file.   Past Surgical History:   Procedure Laterality Date    Elbow surgery Left          Social History     Socioeconomic History    Marital status: Single   Tobacco Use    Smoking status: Never     Passive exposure: Never    Smokeless tobacco: Never   Substance and Sexual Activity    Alcohol use: Yes    Drug use: Not Currently     Types: Cannabis         REVIEW OF SYSTEMS:   GENERAL: feels well otherwise, good appetite  SKIN: no rashes or abnormal skin lesions  HEENT: See HPI  LUNGS: denies shortness of breath or wheezing, See HPI  CARDIOVASCULAR: denies chest pain or palpitations   GI: denies N/V/C or abdominal pain  NEURO: Denies headaches    EXAM:   /66   Pulse 87   Temp 98.3 °F (36.8 °C)   Resp 16   Ht 5' 7\" (1.702 m)   Wt 213 lb (96.6 kg)   LMP  (LMP Unknown)   SpO2 97%   Breastfeeding No   BMI 33.36 kg/m²   GENERAL: well developed, well nourished, in no apparent distress  SKIN: no rashes, no suspicious lesions  HEENT: atraumatic, normocephalic. conjunctiva clear. TM's gray, no bulging, no retraction, + fluid, bony landmarks intact. clear nasal discharge, nasal mucosa erythematous and swollen. Oral mucosa pink,  moist. Posterior pharynx is not erythematous. no exudates. no Sinus tenderness with palpation.   THROAT:NECK: Supple, non-tender  LUNGS: clear to auscultation   CARDIO: RRR without murmur  EXTREMITIES: no cyanosis, clubbing or edema  LYMP: bilateral anterior cervical lymphadenopathy.    ASSESSMENT AND PLAN:   Edith Diaz is a 34 year old female who presents with     Edith was seen today for sinus problem.    Diagnoses and all orders for this visit:    Flu-like symptoms    Declines respiratory testing.   Mucinex DM  If fevers still persist Thurs/Friday f/u higher level of care for possible chest xray.     Risks, benefits, and side effects of medication explained and discussed.    Discussed physical exam and hpi with pt. No bacterial focus noted on exam. Pt has reassuring physical exam consistent with viral uri. Lungs clear bilat. No respiratory distress noted. Treatment options discussed with patient and explained in detail. We reviewed symptomatic care at home. The risks, benefits and potential side effects of possible medications were reviewed. Alternatives were discussed. Monitoring parameters and expected course outlined. Patient to call PCP or go to emergency department if symptoms fail to respond as outlined, or worsen in any way. The patient agreed with the plan.  See Patient Handout    The patient indicates understanding of these issues and agrees to the plan.  The patient is asked to follow up with PCP if sx's persist or worsen.

## 2025-02-23 ENCOUNTER — APPOINTMENT (OUTPATIENT)
Dept: GENERAL RADIOLOGY | Age: 35
End: 2025-02-23
Attending: EMERGENCY MEDICINE
Payer: COMMERCIAL

## 2025-02-23 ENCOUNTER — HOSPITAL ENCOUNTER (OUTPATIENT)
Age: 35
Discharge: HOME | End: 2025-02-23
Attending: EMERGENCY MEDICINE
Payer: COMMERCIAL

## 2025-02-23 VITALS
HEIGHT: 67 IN | DIASTOLIC BLOOD PRESSURE: 76 MMHG | HEART RATE: 83 BPM | OXYGEN SATURATION: 100 % | SYSTOLIC BLOOD PRESSURE: 113 MMHG | RESPIRATION RATE: 20 BRPM | WEIGHT: 214 LBS | BODY MASS INDEX: 33.59 KG/M2 | TEMPERATURE: 98 F

## 2025-02-23 DIAGNOSIS — J01.90 ACUTE NON-RECURRENT SINUSITIS, UNSPECIFIED LOCATION: Primary | ICD-10-CM

## 2025-02-23 PROCEDURE — 71046 X-RAY EXAM CHEST 2 VIEWS: CPT | Performed by: EMERGENCY MEDICINE

## 2025-02-23 PROCEDURE — 99213 OFFICE O/P EST LOW 20 MIN: CPT

## 2025-02-23 PROCEDURE — 99214 OFFICE O/P EST MOD 30 MIN: CPT

## 2025-02-23 NOTE — ED PROVIDER NOTES
Patient Seen in: [facility]      History     Chief Complaint   Patient presents with    Cough     I cannot sleep at night with the congestion and coughing. - Entered by patient     Stated Complaint: Cough - I cannot sleep at night with the congestion and coughing.    Subjective:   HPI      35 yo female has been sick for two weeks. Feels her symptoms have worsened over the last few days. Has had cough and now has sinus pressure, congestion and post nasal drip. Feels fatigued and is having difficulty sleeping at night.     Objective:     History reviewed. No pertinent past medical history.           Past Surgical History:   Procedure Laterality Date    Elbow surgery Left                 No pertinent social history.            Review of Systems    Positive for stated complaint: Cough - I cannot sleep at night with the congestion and coughing.  Other systems are as noted in HPI.  Constitutional and vital signs reviewed.      All other systems reviewed and negative except as noted above.    Physical Exam     ED Triage Vitals [02/23/25 1434]   /76   Pulse 83   Resp 20   Temp 98.2 °F (36.8 °C)   Temp src Oral   SpO2 100 %   O2 Device None (Room air)       Current Vitals:   Vital Signs  BP: 113/76  Pulse: 83  Resp: 20  Temp: 98.2 °F (36.8 °C)  Temp src: Oral    Oxygen Therapy  SpO2: 100 %  O2 Device: None (Room air)        Physical Exam  Vitals and nursing note reviewed.   Constitutional:       Appearance: Normal appearance. She is well-developed.   HENT:      Head: Normocephalic and atraumatic.      Nose: Congestion present.      Mouth/Throat:      Mouth: Mucous membranes are moist.      Pharynx: Oropharynx is clear.   Cardiovascular:      Rate and Rhythm: Normal rate and regular rhythm.   Pulmonary:      Effort: Pulmonary effort is normal. No respiratory distress.      Breath sounds: Rhonchi (left base) present.   Musculoskeletal:      Cervical back: Neck supple.   Skin:     General: Skin is warm  and dry.      Capillary Refill: Capillary refill takes less than 2 seconds.   Neurological:      General: No focal deficit present.      Mental Status: She is alert.   Psychiatric:         Mood and Affect: Mood normal.         Behavior: Behavior normal.            ED Course   Labs Reviewed - No data to display                MDM             Medical Decision Making    Pneumonia, bacterial sinusitis both in differential.   CXR images reviewed by myself.   Disposition and Plan     Clinical Impression:  1. Acute non-recurrent sinusitis, unspecified location         Disposition:  Discharge  2/23/2025  3:56 pm    Follow-up:  Waldo Hernández DO  [address] [place] [id number] [phone]      As needed          Medications Prescribed:  Current Discharge Medication List        START taking these medications    Details   amoxicillin clavulanate 875-125 MG Oral Tab Take 1 tablet by mouth 2 (two) times daily for 7 days.  Qty: 14 tablet, Refills: 0                 Supplementary Documentation:

## 2025-02-23 NOTE — DISCHARGE INSTRUCTIONS
Augmentin as prescribed  Flonase over the counter nasal spray  Dayquil/nyquil for symptom control

## 2025-03-20 RX ORDER — TIRZEPATIDE 15 MG/.5ML
15 INJECTION, SOLUTION SUBCUTANEOUS WEEKLY
Qty: 2 ML | Refills: 0 | Status: SHIPPED | OUTPATIENT
Start: 2025-03-20 | End: 2025-04-11

## 2025-03-20 RX ORDER — TIRZEPATIDE 12.5 MG/.5ML
INJECTION, SOLUTION SUBCUTANEOUS
Qty: 2 ML | Refills: 0 | OUTPATIENT
Start: 2025-03-20

## 2025-03-20 NOTE — TELEPHONE ENCOUNTER
A refill request was received for:  Requested Prescriptions     Pending Prescriptions Disp Refills    ZEPBOUND 12.5 MG/0.5ML Subcutaneous Solution Auto-injector [Pharmacy Med Name: ZEPBOUND 12.5 MG/0.5 ML PEN]  0     Sig: INJECT 12.5 MG INTO THE SKIN ONCE A WEEK FOR 4 DOSES.       Last refill date: 2/6/2025      Last office visit: 1/20/2025    Follow up due:  No future appointments.

## 2025-04-21 RX ORDER — TIRZEPATIDE 15 MG/.5ML
15 INJECTION, SOLUTION SUBCUTANEOUS WEEKLY
Qty: 2 ML | Refills: 1 | Status: SHIPPED | OUTPATIENT
Start: 2025-04-21 | End: 2025-05-13

## 2025-04-21 NOTE — TELEPHONE ENCOUNTER
A refill request was received for:  Requested Prescriptions     Pending Prescriptions Disp Refills    ZEPBOUND 15 MG/0.5ML Subcutaneous Solution Auto-injector [Pharmacy Med Name: ZEPBOUND 15 MG/0.5 ML PEN]  0     Sig: INJECT 15 MG INTO THE SKIN ONCE A WEEK FOR 4 DOSES.       Last refill date:   12-28-24    Last office visit: 1-20-25    Follow up due:  No future appointments.

## 2025-04-24 RX ORDER — TIRZEPATIDE 15 MG/.5ML
15 INJECTION, SOLUTION SUBCUTANEOUS WEEKLY
Qty: 2 ML | Refills: 1 | OUTPATIENT
Start: 2025-04-24 | End: 2025-05-16

## 2025-05-01 RX ORDER — TIRZEPATIDE 15 MG/.5ML
15 INJECTION, SOLUTION SUBCUTANEOUS WEEKLY
Qty: 2 ML | Refills: 1 | OUTPATIENT
Start: 2025-05-01 | End: 2025-05-23

## 2025-07-07 RX ORDER — TIRZEPATIDE 15 MG/.5ML
15 INJECTION, SOLUTION SUBCUTANEOUS WEEKLY
Qty: 2 ML | Refills: 0 | Status: SHIPPED | OUTPATIENT
Start: 2025-07-07 | End: 2025-07-29

## 2025-07-07 NOTE — TELEPHONE ENCOUNTER
.A refill request was received for:  Requested Prescriptions     Pending Prescriptions Disp Refills    ZEPBOUND 15 MG/0.5ML Subcutaneous Solution Auto-injector [Pharmacy Med Name: ZEPBOUND 15 MG/0.5 ML PEN]  1     Sig: INJECT 15 MG INTO THE SKIN ONCE A WEEK FOR 4 DOSES.       Last refill date:   4/21/25    Last office visit: 1/20/25    Follow up due:  No future appointments.

## 2025-08-04 RX ORDER — TIRZEPATIDE 15 MG/.5ML
15 INJECTION, SOLUTION SUBCUTANEOUS WEEKLY
Qty: 2 ML | Refills: 1 | Status: SHIPPED | OUTPATIENT
Start: 2025-08-04 | End: 2025-08-26

## (undated) NOTE — LETTER
Date: 2/18/2025    Patient Name: Edith Diaz          To Whom it may concern:    This letter has been written at the patient's request. The above patient was seen at [facility] for treatment of a medical condition.    This patient should be excused from attending work from days missed.     The patient may return to work when fever free for 24hrs with improved symptoms.         Sincerely,    Olszewski,Kristen J, APRN